# Patient Record
Sex: FEMALE | Race: WHITE | NOT HISPANIC OR LATINO | URBAN - NONMETROPOLITAN AREA
[De-identification: names, ages, dates, MRNs, and addresses within clinical notes are randomized per-mention and may not be internally consistent; named-entity substitution may affect disease eponyms.]

---

## 2017-12-13 NOTE — PATIENT DISCUSSION
The types of intraocular lenses were reviewed with the patient along with a discussion of their various strengths and weaknesses. No advanced.

## 2017-12-13 NOTE — PATIENT DISCUSSION
Plan CE/IOL OD then OS. Recommend Custom vs Basic or Basic Plus.  No Laser OD. +/- I-stent OS only. Po with Dr Roxane Jeff. Pt elects Custom Carlie OU. Pt understands he will wear glasses for near activities.

## 2018-01-15 NOTE — PATIENT DISCUSSION
Plan CE/IOL OD then OS. Recommend Custom vs Basic or Basic Plus.  No Laser OD. +/- I-stent OS only. Po with Dr Nathan Lawler. Pt elects Custom Carlie OU. Pt understands he will wear glasses for near activities.

## 2018-01-16 NOTE — PATIENT DISCUSSION
Plan CE/IOL OD then OS. Recommend Custom vs Basic or Basic Plus.  No Laser OD. +/- I-stent OS only. Po with Dr Alyson Libman. Pt elects Custom Carlie OU. Pt understands he will wear glasses for near activities.

## 2018-01-22 NOTE — PATIENT DISCUSSION
Plan CE/IOL OD then OS. Recommend Custom vs Basic or Basic Plus.  No Laser OD. +/- I-stent OS only. Po with Dr Joaquin Mason. Pt elects Custom Carlie OU. Pt understands he will wear glasses for near activities.

## 2018-01-23 NOTE — PATIENT DISCUSSION
Plan CE/IOL OD then OS. Recommend Custom vs Basic or Basic Plus.  No Laser OD. +/- I-stent OS only. Po with Dr Jerad Blutn. Pt elects Custom Carlie OU. Pt understands he will wear glasses for near activities.

## 2018-01-25 NOTE — PATIENT DISCUSSION
Plan CE/IOL OD then OS. Recommend Custom vs Basic or Basic Plus.  No Laser OD. +/- I-stent OS only. Po with Dr Song Durham. Pt elects Custom Carlie OU. Pt understands he will wear glasses for near activities.

## 2018-01-27 NOTE — PATIENT DISCUSSION
Plan CE/IOL OD then OS. Recommend Custom vs Basic or Basic Plus.  No Laser OD. +/- I-stent OS only. Po with Dr Saji Harris. Pt elects Custom Carlie OU. Pt understands he will wear glasses for near activities.

## 2018-01-27 NOTE — PATIENT DISCUSSION
01/27/2018 IOP still elevated after Diamox 500mg.  Setting pt to see Dr. Brii Felix in [address] [place] [address],[place] [id number] office for paracentesis OS today.

## 2018-01-29 NOTE — PATIENT DISCUSSION
Plan CE/IOL OD then OS. Recommend Custom vs Basic or Basic Plus.  No Laser OD. +/- I-stent OS only. Po with Dr Charis Cardona. Pt elects Custom Carlie OU. Pt understands he will wear glasses for near activities.

## 2018-02-01 NOTE — PATIENT DISCUSSION
Plan CE/IOL OD then OS. Recommend Custom vs Basic or Basic Plus.  No Laser OD. +/- I-stent OS only. Po with Dr Virginia Mendiola. Pt elects Custom Carlie OU. Pt understands he will wear glasses for near activities.

## 2018-02-23 NOTE — PATIENT DISCUSSION
Plan CE/IOL OD then OS. Recommend Custom vs Basic or Basic Plus.  No Laser OD. +/- I-stent OS only. Po with Dr Tamiko Faulkner. Pt elects Custom Carlie OU. Pt understands he will wear glasses for near activities.

## 2019-10-15 NOTE — PATIENT DISCUSSION
1 day PO: Patient is doing well post-operatively. The importance of post-op drop compliance was emphasized. Drop schedule reviewed with patient. Patient to call if any visual changes or concerns.
Glaucoma stable. Recommend I-stent OS at time of Cataract Surgery. Don't recommend Advanced.
High IOP send to [facility] for possible paracentesis.
Monitor.
Patient understands condition, prognosis and need for follow up care.
Plan CE/IOL OD then OS. Recommend Custom vs Basic or Basic Plus.  No Laser OD. +/- I-stent OS only. Po with Dr Tristan Cohen. Pt elects Custom Carlie OU. Pt understands he will wear glasses for near activities.
Stable.
Stable. Reassured pt that it will clear in 10-14 days.
d/t high IOP.
Detail Level: Detailed
Detail Level: Simple

## 2020-01-30 NOTE — PATIENT DISCUSSION
IOP TOO HIGH OS.  ADD RHOPRESSA OS.  CONTINUE ALL OTHER GLC GTTS.  (OK TO USE DORZ AND BRIMONIDINE BID).   POSSIBLE RESPONSE TO LOTEMAX.  DISCUSSED IF IOP NOT CONTROLLED AT NEXT VISIT, TC SURGERY.

## 2020-02-07 NOTE — PATIENT DISCUSSION
IOP showing improvement with new med but has only been taking one week. Will evauate again in 3 weeks once pt has been using for a month. May need surgery .

## 2020-03-06 NOTE — PATIENT DISCUSSION
Some improvement with Rhopressa but IOP still not at target range in OS. Pt on MTMT Recommend a Glaucoma surgery. Options of Trab or XEN. R&B reviewed with patient including additional risks with XEN secondary to hx of iritis. Understands further procedures or sx may be needed in the future. EOMI; PERRL; no drainage or redness

## 2020-05-06 NOTE — PATIENT DISCUSSION
Some improvement with Rhopressa but IOP still not at target range in OS. Pt on MTMT Recommend a Glaucoma surgery. Options of Trab or XEN. R&B reviewed with patient including additional risks with XEN secondary to hx of iritis. Understands further procedures or sx may be needed in the future.

## 2020-05-13 NOTE — PATIENT DISCUSSION
GOOD POST OPERATIVE APPEARANCE , REVIEWED GTTS AND INSTRUCTIONS NO EYE RUBBING METAL SHIELD WHILE SLEEPING.

## 2020-05-20 NOTE — PATIENT DISCUSSION
GOOD POST OPERATIVE APPEARANCE, CONTINUE DUREZOL QID.  HOLD VIGAMOX.  SHIELD QHS.  NO EYE RUBBING, HEAD ABOVE HEART, NO HEAVY LIFTING.

## 2020-06-03 NOTE — PATIENT DISCUSSION
GOOD POST OPERATIVE APPEARANCE, CONTINUE DUREZOL QID.  NO EYE RUBBING, HEAD ABOVE HEART, NO HEAVY LIFTING.

## 2020-06-17 ENCOUNTER — IMPORTED ENCOUNTER (OUTPATIENT)
Dept: URBAN - NONMETROPOLITAN AREA CLINIC 1 | Facility: CLINIC | Age: 30
End: 2020-06-17

## 2020-06-17 PROCEDURE — S0621 ROUTINE OPHTHALMOLOGICAL EXA: HCPCS

## 2020-06-17 PROCEDURE — 92310 CONTACT LENS FITTING OU: CPT

## 2020-06-24 NOTE — PATIENT DISCUSSION
GOOD POST OPERATIVE APPEARANCE, CONTINUE DUREZOL QID FOR 2 WEEKS THEN DECREASE TO TID.  NO EYE RUBBING.

## 2020-08-05 NOTE — PATIENT DISCUSSION
GOOD POST OPERATIVE APPEARANCE, CONTINUE DUREZOL BID FOR 3 WEEKS THEN DECREASE TO QD.  NO EYE RUBBING.

## 2020-08-27 NOTE — PATIENT DISCUSSION
Pt seen 3 weeks early due to going out of town for a few months.  Doing well.   Started using Durezol bid yesterday.  Continue bid for 1 month then decrease to QD until next visit.

## 2020-11-12 NOTE — PATIENT DISCUSSION
Glaucoma stable. Recommend I-stent OS at time of Cataract Surgery. Don't recommend Advanced. Include Pregnancy/Lactation Warning?: No

## 2021-05-05 ENCOUNTER — IMPORTED ENCOUNTER (OUTPATIENT)
Dept: URBAN - NONMETROPOLITAN AREA CLINIC 1 | Facility: CLINIC | Age: 31
End: 2021-05-05

## 2021-05-05 PROBLEM — H00.12: Noted: 2021-05-05

## 2021-05-05 PROCEDURE — 99213 OFFICE O/P EST LOW 20 MIN: CPT

## 2021-05-05 NOTE — PATIENT DISCUSSION
Chalazion: od l/l-Recommend pt begin warm compresses and lid scrubs BID as well as artificial tears OU QID. Pt instructed on proper technique for lid scrubs. -If no improvement in 1-2 weeks consider sending pt to Dr Valdez Maddox for eval of I&D.start keflex 500 mg tid po x 1wkstart tobradex 1 gtt qid od x 1wk

## 2021-06-19 NOTE — PATIENT DISCUSSION
Plan CE/IOL OD then OS. Recommend Custom vs Basic or Basic Plus.  No Laser OD. +/- I-stent OS only. Po with Dr Marisel Bolanos. Pt elects Custom Carlie OU. Pt understands he will wear glasses for near activities. No

## 2021-09-20 ENCOUNTER — IMPORTED ENCOUNTER (OUTPATIENT)
Dept: URBAN - NONMETROPOLITAN AREA CLINIC 1 | Facility: CLINIC | Age: 31
End: 2021-09-20

## 2021-09-20 PROBLEM — H52.13: Noted: 2021-09-20

## 2021-09-20 PROCEDURE — S0621 ROUTINE OPHTHALMOLOGICAL EXA: HCPCS

## 2021-09-20 PROCEDURE — 92310 CONTACT LENS FITTING OU: CPT

## 2022-04-06 NOTE — PATIENT DISCUSSION
Rx for Latanoprost sent to [facility] for 90 day supply with refills - DS.

## 2022-04-06 NOTE — PATIENT DISCUSSION
HVF STABLE OU.  CONTINUE LATANOPROST OD ONLY.  CONTINUE TO MONITOR W/ MCM EVERY 3-4 MONTHS W/ HVF, OCT AND OPTIC NERVE EVAL.    IF CHANGES, SEND BACK TO [facility].

## 2022-04-09 ASSESSMENT — VISUAL ACUITY
OS_SC: J1
OS_SC: 20/20
OD_SC: 20/30
OD_SC: J1
OD_SC: 20/30-2
OS_SC: 20/25
OS_CC: J1
OS_SC: 20/25
OD_SC: 20/20
OD_CC: J1

## 2022-04-09 ASSESSMENT — TONOMETRY
OD_IOP_MMHG: 16
OD_IOP_MMHG: 16
OS_IOP_MMHG: 16
OS_IOP_MMHG: 16

## 2023-01-16 NOTE — PATIENT DISCUSSION
04/06/22 [facility] exam:  HVF STABLE OU.  CONTINUE LATANOPROST OD ONLY.  CONTINUE TO MONITOR W/ MCM EVERY 3-4 MONTHS W/ HVF, OCT AND OPTIC NERVE EVAL.    IF CHANGES, SEND BACK TO [facility].

## 2023-08-15 ENCOUNTER — COMPREHENSIVE EXAM (OUTPATIENT)
Dept: RURAL CLINIC 1 | Facility: CLINIC | Age: 33
End: 2023-08-15

## 2023-08-15 DIAGNOSIS — H52.13: ICD-10-CM

## 2023-08-15 PROCEDURE — 92310-E CONTACT LENS FITTING ESTABLISH PATIENT

## 2023-08-15 PROCEDURE — S0621 ROUTINE OPHTHALMOLOGICAL EXA: HCPCS

## 2023-08-15 ASSESSMENT — TONOMETRY
OS_IOP_MMHG: 16
OD_IOP_MMHG: 16

## 2023-08-15 ASSESSMENT — VISUAL ACUITY
OS_CC: 20/30
OD_CC: 20/20-1
OU_CC: 20/20 BLURRY

## 2024-08-19 ENCOUNTER — COMPREHENSIVE EXAM (OUTPATIENT)
Dept: RURAL CLINIC 1 | Facility: CLINIC | Age: 34
End: 2024-08-19

## 2024-08-19 DIAGNOSIS — H52.13: ICD-10-CM

## 2024-08-19 PROCEDURE — S0621AEC ROUTINE OPH EXAM INCLUDES REF/ EST PATIENT

## 2024-08-19 PROCEDURE — 92310-E CONTACT LENS FITTING ESTABLISH PATIENT

## 2024-08-19 ASSESSMENT — VISUAL ACUITY
OS_CC: 20/20
OD_CC: 20/20
OS_CC: 20/25
OU_CC: 20/30
OD_CC: 20/30
OU_CC: 20/20

## 2024-08-19 ASSESSMENT — TONOMETRY
OD_IOP_MMHG: 16
OS_IOP_MMHG: 17

## 2024-09-19 NOTE — PATIENT DISCUSSION
1 day PO: Patient is doing well post-operatively. The importance of post-op drop compliance was emphasized. Drop schedule reviewed with patient. Patient to call if any visual changes or concerns. Subjective:   Patient ID: Debbie Gu is a 32 year old female.    Cough  Pertinent negatives include no chills, fever, shortness of breath or wheezing.   X yest - + fatigue  + cough - occas  W/o fever      History/Other:   Review of Systems   Constitutional:  Positive for fatigue. Negative for chills and fever.   Respiratory:  Positive for cough. Negative for shortness of breath and wheezing.      Current Outpatient Medications   Medication Sig Dispense Refill    albuterol 108 (90 Base) MCG/ACT Inhalation Aero Soln Inhale 2 puffs into the lungs every 6 (six) hours as needed for Wheezing. 8.5 g 0    predniSONE 20 MG Oral Tab Take 2 tablets (40 mg total) by mouth daily for 5 days. 10 tablet 0    butalbital-acetaminophen-caffeine -40 MG Oral Tab Take 1 tablet by mouth every 4 (four) hours as needed for Pain or Headaches. (Patient not taking: Reported on 10/17/2023) 30 tablet 0    triamcinolone 0.1 % External Cream Apply topically 2 (two) times daily as needed. (Patient not taking: Reported on 10/17/2023) 60 g 0    famotidine (PEPCID) 20 MG Oral Tab 1 po q d (Patient not taking: Reported on 4/27/2023) 90 tablet 1    Ketorolac Tromethamine 10 MG Oral Tab Take 10 mg by mouth every 6 (six) hours as needed. (Patient not taking: Reported on 4/27/2023)      nitrofurantoin monohydrate macro 100 MG Oral Cap Take 1 capsule (100 mg total) by mouth daily as needed (post-coital antibiotic to prevent UTI). (Patient not taking: Reported on 7/25/2022) 30 capsule 0    EPINEPHRINE IJ inject as needed for pork allergy (Patient not taking: Reported on 10/17/2023)       Allergies:  Allergies   Allergen Reactions    Benadryl [Diphenhydramine] HIVES     rash    Tiago Flavor SWELLING    Red Dye HIVES     Pt states she reacts to the red dye in Benadryl pills.       Objective:   Physical Exam  Vitals reviewed.   Constitutional:       Appearance: Normal appearance.   Cardiovascular:      Rate and Rhythm: Normal rate and regular  rhythm.      Pulses: Normal pulses.      Heart sounds: Normal heart sounds.   Pulmonary:      Effort: Pulmonary effort is normal.      Breath sounds: No wheezing.      Comments: Coarse breath sounds  Musculoskeletal:      Cervical back: Normal range of motion and neck supple.   Lymphadenopathy:      Cervical: No cervical adenopathy.   Neurological:      Mental Status: She is alert.       /70   Pulse 102   Temp 98.9 °F (37.2 °C) (Temporal)   Wt 167 lb (75.8 kg)   SpO2 98%   Breastfeeding No   BMI 25.39 kg/m²     Assessment & Plan:   1. Bronchitis        No orders of the defined types were placed in this encounter.      Meds This Visit:  Requested Prescriptions     Signed Prescriptions Disp Refills    albuterol 108 (90 Base) MCG/ACT Inhalation Aero Soln 8.5 g 0     Sig: Inhale 2 puffs into the lungs every 6 (six) hours as needed for Wheezing.    predniSONE 20 MG Oral Tab 10 tablet 0     Sig: Take 2 tablets (40 mg total) by mouth daily for 5 days.       Imaging & Referrals:  None